# Patient Record
Sex: MALE | Race: WHITE | URBAN - METROPOLITAN AREA
[De-identification: names, ages, dates, MRNs, and addresses within clinical notes are randomized per-mention and may not be internally consistent; named-entity substitution may affect disease eponyms.]

---

## 2020-06-25 ENCOUNTER — OFFICE VISIT (OUTPATIENT)
Dept: INTERNAL MEDICINE CLINIC | Age: 35
End: 2020-06-25
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
## 2020-06-25 NOTE — PROGRESS NOTES
Tick bite  2 days ago  Face time technology used  Worked in yard over weekend had socks on, no memory of bite    Notes small dot tick  2 days ago removed from left foot    No systemic issues    Red venita noted left foot yesterday and less today was alittle itchy      Good health      No past medical history on file. Social History     Tobacco Use    Smoking status: Not on file   Substance Use Topics    Alcohol use: Not on file    Drug use: Not on file     Alert oriented not ill appearing      Skin exam - LESIONS NOTED: bite top left foot above 4th toe   Not too swollen and no rash or erythema migrans. 1. Tick bite, initial encounter  Really benign no need antibiotics   Could take prophylactic doxy but not needed in this community    Warning signs discussed  Of ECM      The patient is reassured that these symptoms do not appear to represent a serious or threatening condition.

## 2020-07-24 ENCOUNTER — HOSPITAL ENCOUNTER (OUTPATIENT)
Dept: LAB | Age: 35
Discharge: HOME | End: 2020-07-24

## 2020-07-24 ENCOUNTER — VIRTUAL VISIT (OUTPATIENT)
Dept: INTERNAL MEDICINE CLINIC | Age: 35
End: 2020-07-24
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
## 2020-07-24 DIAGNOSIS — S40.862A INSECT BITE OF LEFT UPPER EXTREMITY, INITIAL ENCOUNTER: ICD-10-CM
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
## 2020-07-24 DIAGNOSIS — R21 RASH: ICD-10-CM
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
## 2020-07-24 RX ORDER — PREDNISONE 10 MG/1
10 TABLET ORAL SEE ADMIN INSTRUCTIONS
Qty: 21 TAB | Refills: 0 | Status: SHIPPED | OUTPATIENT
Start: 2020-07-24 | End: 2022-06-23 | Stop reason: ALTCHOICE

## 2020-07-24 NOTE — PROGRESS NOTES
Gayle Cuevas is a 28 y.o. male who was seen by synchronous (real-time) audio-video technology on 7/24/2020 for Other (wasp sting on left arm 7/23/2020, has a red bulls eye, painful and swelling , wants testing for Lyme disease becuase he had tick bite last month)        Assessment & Plan:   Diagnoses and all orders for this visit:    1. Tick bite, subsequent encounter  -     LYME AB, IGG & IGM BY WB; Future    2. Rash  -     LYME AB, IGG & IGM BY WB; Future    3. Insect bite of left upper extremity, initial encounter  -     predniSONE (STERAPRED DS) 10 mg dose pack; Take 1 Tab by mouth See Admin Instructions. See administration instruction per 10mg dose pack    patient advised that he may continue his topical steroid cream and the oral antihistamine. I have added a steroid for him to take. Labs have been placed to follow up the tick bite. He will be contact with the labs once back. I spent at least 15 minutes on this visit with this established patient. 712  Subjective:   Patient presents for evaluation of his left arm and for follow up of a tick bite. He reports he was outside and noticed some black wasp. He states he felt a sting and although he did not see the wasp sting him, he is sure it was the black wasp. He noticed his arm started to swell immediately. He put alcohol and baking soda on the area and it did help with the swelling. This happened on July 23 rd. Since then he has been using topical steroid cream and taking benadryl. He reports he is concerned because the swelling is not resolving and to him it appears to have a bulls eye appearance. He shares he had a tick bite about a month ago and at that time he was given reassurance that treatment was not needed. Since then he denies have muscular pain but reports he has noticed some random raise bumps on his body.  Also he reports he did some reach and found that after having a tick bite if a person is stung by a wasp it can cause a rash that mimics lyme disease. Prior to Admission medications    Medication Sig Start Date End Date Taking? Authorizing Provider   predniSONE (STERAPRED DS) 10 mg dose pack Take 1 Tab by mouth See Admin Instructions. See administration instruction per 10mg dose pack 7/24/20  Yes Parvin Jasmine PA-C     There are no active problems to display for this patient. Current Outpatient Medications   Medication Sig Dispense Refill    predniSONE (STERAPRED DS) 10 mg dose pack Take 1 Tab by mouth See Admin Instructions. See administration instruction per 10mg dose pack 21 Tab 0     Allergies   Allergen Reactions    Promethazine Swelling       ROS  See above  Objective:   No flowsheet data found. General: alert, cooperative, no distress   Mental  status: normal mood, behavior, speech, dress, motor activity, and thought processes, able to follow commands   HENT: NCAT   Neck: no visualized mass   Resp: no respiratory distress   Neuro: no gross deficits   Skin:  Left forearm erythema and edema noted of the medial aspect of forearm. Not able to appreciate the variation in color that was noted by the patient due to the camera. Patient notes the erythema appears to have slight variation in the degree of erythema. Psychiatric: normal affect, consistent with stated mood, no evidence of hallucinations     Additional exam findings: We discussed the expected course, resolution and complications of the diagnosis(es) in detail. Medication risks, benefits, costs, interactions, and alternatives were discussed as indicated. I advised him to contact the office if his condition worsens, changes or fails to improve as anticipated. He expressed understanding with the diagnosis(es) and plan. Kobe Phelps, who was evaluated through a patient-initiated, synchronous (real-time) audio-video encounter, and/or his healthcare decision maker, is aware that it is a billable service, with coverage as determined by his insurance carrier. He provided verbal consent to proceed: Yes, and patient identification was verified. It was conducted pursuant to the emergency declaration under the [address] [place] [address] and the Kenneth [employer] and [employer] Act. A caregiver was present when appropriate. Ability to conduct physical exam was limited. I was at home. The patient was at home.       Briscoe Bloch Lumpkin, PA-C

## 2020-07-28 LAB
B BURGDOR IGG PATRN SER IB-IMP: NEGATIVE
B BURGDOR IGM PATRN SER IB-IMP: NEGATIVE
B BURGDOR18KD IGG SER QL IB: ABNORMAL
B BURGDOR23KD IGG SER QL IB: ABNORMAL
B BURGDOR23KD IGM SER QL IB: ABNORMAL
B BURGDOR28KD IGG SER QL IB: ABNORMAL
B BURGDOR30KD IGG SER QL IB: ABNORMAL
B BURGDOR39KD IGG SER QL IB: ABNORMAL
B BURGDOR39KD IGM SER QL IB: ABNORMAL
B BURGDOR41KD IGG SER QL IB: PRESENT
B BURGDOR41KD IGM SER QL IB: ABNORMAL
B BURGDOR45KD IGG SER QL IB: ABNORMAL
B BURGDOR58KD IGG SER QL IB: ABNORMAL
B BURGDOR66KD IGG SER QL IB: ABNORMAL
B BURGDOR93KD IGG SER QL IB: ABNORMAL

## 2022-06-23 ENCOUNTER — OFFICE VISIT (OUTPATIENT)
Dept: INTERNAL MEDICINE CLINIC | Age: 37
End: 2022-06-23
Payer: MEDICAID

## 2022-06-23 VITALS
BODY MASS INDEX: 26.52 KG/M2 | OXYGEN SATURATION: 98 % | HEART RATE: 71 BPM | WEIGHT: 175 LBS | HEIGHT: 68 IN | DIASTOLIC BLOOD PRESSURE: 74 MMHG | TEMPERATURE: 98.6 F | SYSTOLIC BLOOD PRESSURE: 111 MMHG | RESPIRATION RATE: 18 BRPM

## 2022-06-23 DIAGNOSIS — R21 RASH: Primary | ICD-10-CM

## 2022-06-23 DIAGNOSIS — L25.9 CONTACT DERMATITIS, UNSPECIFIED CONTACT DERMATITIS TYPE, UNSPECIFIED TRIGGER: ICD-10-CM

## 2022-06-23 PROCEDURE — 99213 OFFICE O/P EST LOW 20 MIN: CPT | Performed by: INTERNAL MEDICINE

## 2022-06-23 RX ORDER — OMEPRAZOLE 40 MG/1
CAPSULE, DELAYED RELEASE ORAL
COMMUNITY
Start: 2022-04-19

## 2022-06-23 RX ORDER — METHYLPREDNISOLONE 4 MG/1
TABLET ORAL
Qty: 1 DOSE PACK | Refills: 0 | Status: SHIPPED | OUTPATIENT
Start: 2022-06-23

## 2022-06-23 NOTE — PROGRESS NOTES
Nguyen Chavez is a 40 y.o. male    Chief Complaint   Patient presents with    Rash     generalized x 1 wk; very itchy       Visit Vitals  /74   Pulse 71   Temp 98.6 °F (37 °C) (Temporal)   Resp 18   Ht 5' 8\" (1.727 m)   Wt 175 lb (79.4 kg)   SpO2 98%   BMI 26.61 kg/m²       3 most recent PHQ Screens 6/23/2022   Little interest or pleasure in doing things Not at all   Feeling down, depressed, irritable, or hopeless Not at all   Total Score PHQ 2 0       No flowsheet data found. No flowsheet data found. 1. Have you been to the ER, urgent care clinic since your last visit? Hospitalized since your last visit? No     2. Have you seen or consulted any other health care providers outside of the [address] [place] [address] since your last visit? Include any pap smears or colon screening.  No

## 2022-06-23 NOTE — PROGRESS NOTES
Harvin Dakins is a 40 y.o. male  Chief Complaint   Patient presents with    Rash     generalized x 1 wk; very itchy       Visit Vitals  /74   Pulse 71   Temp 98.6 °F (37 °C) (Temporal)   Resp 18   Ht 5' 8\" (1.727 m)   Wt 175 lb (79.4 kg)   SpO2 98%   BMI 26.61 kg/m²          HPI  Went to [place] and started developing itching rash that started on his bilateral leg then to his gluteal cleft and trunk. He uses over the counter cream with minimal benefit. He doesn't have systemic symptoms. He was hiking [place] [facility] just before symptoms started and was fully clothed and had a boots on. .  Social History     Socioeconomic History    Marital status:    Tobacco Use    Smoking status: Light Tobacco Smoker    Smokeless tobacco: Never Used   Substance and Sexual Activity    Alcohol use: Yes      . Past Medical History:   Diagnosis Date    Diverticulitis 2021        Allergies   Allergen Reactions    Promethazine Swelling          ROS  Review of Systems   All other systems reviewed and are negative. EXAM  Physical Exam  Vitals reviewed. Constitutional:       Appearance: Normal appearance. HENT:      Head: Normocephalic and atraumatic. Cardiovascular:      Rate and Rhythm: Normal rate and regular rhythm. Skin:            Comments: Rash distribution    Neurological:      Mental Status: He is alert. Health Maintenance Due   Topic Date Due    Hepatitis C Screening  Never done    COVID-19 Vaccine (1) Never done    Pneumococcal 0-64 years (1 - PCV) Never done    DTaP/Tdap/Td series (1 - Tdap) Never done    Depression Screen  07/24/2021       ASSESSMENT/PLAN    Diagnoses and all orders for this visit:    1. Rash  -     CBC WITH AUTOMATED DIFF; Future  -     R RICKETTSII AB IGG W/REFL; Future  -     METABOLIC PANEL, COMPREHENSIVE; Future  -     REFERRAL TO DERMATOLOGY    2.  Contact dermatitis, unspecified contact dermatitis type, unspecified trigger    Other orders  - methylPREDNISolone (MEDROL DOSEPACK) 4 mg tablet; Use as directed on packet            Laura Cisneros MD

## 2022-06-28 LAB
ALBUMIN SERPL-MCNC: 5.1 G/DL (ref 4–5)
ALBUMIN/GLOB SERPL: 1.9 {RATIO} (ref 1.2–2.2)
ALP SERPL-CCNC: 66 IU/L (ref 44–121)
ALT SERPL-CCNC: 15 IU/L (ref 0–44)
AST SERPL-CCNC: 21 IU/L (ref 0–40)
BASOPHILS # BLD AUTO: 0.1 X10E3/UL (ref 0–0.2)
BASOPHILS NFR BLD AUTO: 1 %
BILIRUB SERPL-MCNC: 0.4 MG/DL (ref 0–1.2)
BUN SERPL-MCNC: 9 MG/DL (ref 6–20)
BUN/CREAT SERPL: 12 (ref 9–20)
CALCIUM SERPL-MCNC: 9.5 MG/DL (ref 8.7–10.2)
CHLORIDE SERPL-SCNC: 99 MMOL/L (ref 96–106)
CO2 SERPL-SCNC: 25 MMOL/L (ref 20–29)
CREAT SERPL-MCNC: 0.74 MG/DL (ref 0.76–1.27)
EGFR: 120 ML/MIN/1.73
EOSINOPHIL # BLD AUTO: 0.1 X10E3/UL (ref 0–0.4)
EOSINOPHIL NFR BLD AUTO: 1 %
ERYTHROCYTE [DISTWIDTH] IN BLOOD BY AUTOMATED COUNT: 13.4 % (ref 11.6–15.4)
GLOBULIN SER CALC-MCNC: 2.7 G/DL (ref 1.5–4.5)
GLUCOSE SERPL-MCNC: 96 MG/DL (ref 65–99)
HCT VFR BLD AUTO: 46.6 % (ref 37.5–51)
HGB BLD-MCNC: 15.3 G/DL (ref 13–17.7)
IMM GRANULOCYTES # BLD AUTO: 0 X10E3/UL (ref 0–0.1)
IMM GRANULOCYTES NFR BLD AUTO: 0 %
LYMPHOCYTES # BLD AUTO: 0.9 X10E3/UL (ref 0.7–3.1)
LYMPHOCYTES NFR BLD AUTO: 13 %
MCH RBC QN AUTO: 27.5 PG (ref 26.6–33)
MCHC RBC AUTO-ENTMCNC: 32.8 G/DL (ref 31.5–35.7)
MCV RBC AUTO: 84 FL (ref 79–97)
MONOCYTES # BLD AUTO: 0.3 X10E3/UL (ref 0.1–0.9)
MONOCYTES NFR BLD AUTO: 4 %
NEUTROPHILS # BLD AUTO: 5.5 X10E3/UL (ref 1.4–7)
NEUTROPHILS NFR BLD AUTO: 81 %
PLATELET # BLD AUTO: 263 X10E3/UL (ref 150–450)
POTASSIUM SERPL-SCNC: 4.4 MMOL/L (ref 3.5–5.2)
PROT SERPL-MCNC: 7.8 G/DL (ref 6–8.5)
R RICKETTSI IGG SER QL IA: NEGATIVE
RBC # BLD AUTO: 5.57 X10E6/UL (ref 4.14–5.8)
SODIUM SERPL-SCNC: 140 MMOL/L (ref 134–144)
WBC # BLD AUTO: 6.7 X10E3/UL (ref 3.4–10.8)

## 2023-05-29 RX ORDER — METHYLPREDNISOLONE 4 MG/1
TABLET ORAL
COMMUNITY
Start: 2022-06-23

## 2023-05-29 RX ORDER — OMEPRAZOLE 40 MG/1
CAPSULE, DELAYED RELEASE ORAL
COMMUNITY
Start: 2022-04-19

## 2024-10-23 ENCOUNTER — TELEMEDICINE (OUTPATIENT)
Age: 39
End: 2024-10-23

## 2024-10-23 DIAGNOSIS — Z71.83 ENCOUNTER FOR NONPROCREATIVE GENETIC COUNSELING AND TESTING: ICD-10-CM

## 2024-10-23 DIAGNOSIS — Z80.3 FAMILY HISTORY OF BREAST CANCER: ICD-10-CM

## 2024-10-23 DIAGNOSIS — Z80.41 FAMILY HISTORY OF OVARIAN CANCER: ICD-10-CM

## 2024-10-23 DIAGNOSIS — Z13.71 ENCOUNTER FOR NONPROCREATIVE GENETIC COUNSELING AND TESTING: ICD-10-CM

## 2024-10-23 DIAGNOSIS — Z84.81 FAMILY HISTORY OF CARRIER OF GENETIC DISEASE: Primary | ICD-10-CM

## 2024-10-23 NOTE — PROGRESS NOTES
identified on genetic testing and the patient's mother and brother.  The patient's likelihood of also having this variant of uncertain significance in the FH gene is 50%.  Variants of uncertain significance are changes in genes from what we expect to see but we do not know if these changes cause an increased risk for cancer or not.  Medical management is not typically changed based on variants of uncertain significance and cancer risks cannot be assessed based on variants of uncertain significance.  Given that the patient lives in [place] I will need to call him tomorrow to confirm testing logistics.  We will also review the [facility] billing policy at that time.      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 The Multi-Cancer panel was ordered through [facility].  The patient will be contacted to setup a follow-up results disclosure appointment.     The patient was given time to ask questions and communicated understanding of and agreement with the plan.  Time spent in direct patient care: 60 minutes.    Rosa Sandoval MS, [facility]

## 2024-10-24 ENCOUNTER — TELEPHONE (OUTPATIENT)
Age: 39
End: 2024-10-24

## 2024-10-24 NOTE — TELEPHONE ENCOUNTER
Talked to patient via phone. Advised that [facility] will send a blood collection kit to his home address. He can take this kit to any [facility] location.    Yesterday the patient elected to have the testing billed through his insurance (vs. Self-pay). The final bill will come directly from [facility] lab, any billing questions/concerns should be directed to [facility].

## 2024-11-04 NOTE — PROGRESS NOTES
[facility]  A Part of [facility]  Genetic Counseling   [address] [place] [address] [address]  [place] [address]  Phone: [phone]  Fax: [phone]    Date: 11/6/2024  Patient Name: Fransico Rader  YOB: 1985  Referring Provider: None (self-referral)     INDICATION: Disclosure of germline genetic test results by phone.    INTERVAL HISTORY: Fransico Rader is a 39 y.o. 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 I spoke with him by phone today to review the results of his genetic testing and their implications.    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   No other pathogenic variants or variants of uncertain significance were detected in the other genes analyzed. The increased risk allele in APC which was previously identified in the patient's mother and brother was not identified in the patient.   Testing performed: [facility] Multi Cancer Panel (see end of note for genes analyzed)  A full copy of results is available in the patient's record for additional details.     INTERPRETATION & DISCUSSION:  Genetic testing did not identify any genetic mutations associated with a hereditary cancer syndrome, including the APC increased risk allele previously identified in the patient's mother and brother.    A variant of uncertain significance (VUS) was identified in FH. A variant of uncertain significance (VUS) is a change in a gene from what we expect to see, but we do not know if it causes an increased risk for cancer or not. Pathogenic variants in the FH gene have been associated with autosomal dominant FH tumor predisposition syndrome and autosomal recessive fumarate hydratase deficiency (FHD).    Medical management is not typically changed based

## 2024-11-06 ENCOUNTER — SCHEDULED TELEPHONE ENCOUNTER (OUTPATIENT)
Age: 39
End: 2024-11-06

## 2024-11-06 DIAGNOSIS — Z71.83 ENCOUNTER FOR NONPROCREATIVE GENETIC COUNSELING: Primary | ICD-10-CM
